# Patient Record
Sex: FEMALE | Race: BLACK OR AFRICAN AMERICAN | NOT HISPANIC OR LATINO | Employment: OTHER | URBAN - METROPOLITAN AREA
[De-identification: names, ages, dates, MRNs, and addresses within clinical notes are randomized per-mention and may not be internally consistent; named-entity substitution may affect disease eponyms.]

---

## 2017-06-14 ENCOUNTER — TELEPHONE (OUTPATIENT)
Dept: OBSTETRICS AND GYNECOLOGY | Facility: CLINIC | Age: 47
End: 2017-06-14

## 2017-06-14 NOTE — TELEPHONE ENCOUNTER
Patient was requesting a sooner appointment.  Left a message for the patient that we could place her on the wait list  If the is a cancellation she will get a call offering her the appointment.

## 2017-06-14 NOTE — TELEPHONE ENCOUNTER
----- Message from Aracely Grande sent at 6/12/2017  2:54 PM CDT -----  Contact: SELF/[phone]  Patient is a new patient that was referred by Dr. Qing Dye for and abnormal pap and she is scheduled for 7/31/17 and she would like to be seen sooner if possible.  Please advise

## 2017-06-19 ENCOUNTER — TELEPHONE (OUTPATIENT)
Dept: OBSTETRICS AND GYNECOLOGY | Facility: CLINIC | Age: 47
End: 2017-06-19

## 2017-06-19 NOTE — TELEPHONE ENCOUNTER
L/M to call me back to set up appt. For 07/12/2017 to see dr. Durham for abnormal pap results.  Hopefully she will call me back.

## 2017-07-12 ENCOUNTER — TELEPHONE (OUTPATIENT)
Dept: OBSTETRICS AND GYNECOLOGY | Facility: CLINIC | Age: 47
End: 2017-07-12

## 2017-07-12 NOTE — TELEPHONE ENCOUNTER
Spoke with patient, patient is upset about rescheduling said she wants to be seen tomm or Friday. Patient said she has been waiting on her appt For a month and it is planned around her cycle,was RUDE because she was upset. Told patient I would have talk to you before scheduling her because Thursdays are for OBS and Friday we are booked.

## 2017-07-13 NOTE — PROGRESS NOTES
I'm sorry that the patient had to be rescheduled.  There was an emergency surgery that had to be completed today.      I would be happy to see this patient this Friday July 14 at 930am to discuss management of abnormal pap smear. She will need to bring a copy of her pathology report as I do NOT have this information in my office.  It is ok if she is on her cycle. She will NOT have a procedure completed at her initial visit. The initial visit is consultation to discuss management of abnormal pap smear.    Dr Durham

## 2017-07-31 ENCOUNTER — OFFICE VISIT (OUTPATIENT)
Dept: OBSTETRICS AND GYNECOLOGY | Facility: CLINIC | Age: 47
End: 2017-07-31
Payer: COMMERCIAL

## 2017-07-31 ENCOUNTER — HOSPITAL ENCOUNTER (OUTPATIENT)
Dept: RADIOLOGY | Facility: HOSPITAL | Age: 47
Discharge: HOME | End: 2017-07-31
Attending: OBSTETRICS & GYNECOLOGY
Payer: COMMERCIAL

## 2017-07-31 VITALS
DIASTOLIC BLOOD PRESSURE: 82 MMHG | BODY MASS INDEX: 26.41 KG/M2 | SYSTOLIC BLOOD PRESSURE: 120 MMHG | WEIGHT: 134.5 LBS | HEIGHT: 60 IN

## 2017-07-31 DIAGNOSIS — Z12.31 VISIT FOR SCREENING MAMMOGRAM: ICD-10-CM

## 2017-07-31 DIAGNOSIS — N87.9 CERVICAL DYSPLASIA: Primary | ICD-10-CM

## 2017-07-31 PROCEDURE — 99499 UNLISTED E&M SERVICE: CPT | Mod: S$GLB,,, | Performed by: OBSTETRICS & GYNECOLOGY

## 2017-07-31 PROCEDURE — 88305 TISSUE EXAM BY PATHOLOGIST: CPT | Performed by: PATHOLOGY

## 2017-07-31 PROCEDURE — 77063 BREAST TOMOSYNTHESIS BI: CPT | Mod: 26,,, | Performed by: RADIOLOGY

## 2017-07-31 PROCEDURE — 57454 BX/CURETT OF CERVIX W/SCOPE: CPT | Mod: S$GLB,,, | Performed by: OBSTETRICS & GYNECOLOGY

## 2017-07-31 PROCEDURE — 99999 PR PBB SHADOW E&M-EST. PATIENT-LVL III: CPT | Mod: PBBFAC,,, | Performed by: OBSTETRICS & GYNECOLOGY

## 2017-07-31 PROCEDURE — 77067 SCR MAMMO BI INCL CAD: CPT | Mod: 26,,, | Performed by: RADIOLOGY

## 2017-07-31 PROCEDURE — 77067 SCR MAMMO BI INCL CAD: CPT | Mod: TC

## 2017-07-31 NOTE — PROGRESS NOTES
Colposcopy  Date: 2017  Time:2:50 PM  Name of the procedure: Colposcopy  Indications: Tena Pabon is a 46 y.o. female  who presents today for colposcopy.  Patient's last menstrual period was 2017..      Patient consent: Risks/benefits of the procedure were discussed with the patient. Patient's questions were answered.  Consents signed.  TIME OUT completed.  Labs:   Office urine pregnancy test negative; Pap LGSIL pap from 3/2017Procedure: Speculum placed in the vagina to visualize the cervix.   No gross lesions appreciated  Acetic acid applied to cervix. Aceto-white lesion noted at 3 oclock; Lugol's solution applied and lesion still noted at 3  oclock.   No mosaicism, no abnormal blood vessels. Biopsy taken. ECC collected.  Transformation zone completely visualized and yes lesions appreciated.  Hemostasis achieved.  Complications:none  EBL: min  Disposition: Pt tolerated the procedure well.      Impression: satisfactory pap smear, CHITRA 1    A/P:   1) Cervical Dysplasia  -patient s/p uncomplicated colposcopy  -Patient instructed to contact MD or report to emergency room for fever greater than 100.4F, vaginal bleeding greater than 2 pads/hour, severe abdominal pain not relieved with NSAIDs.    Order for mammogram placed    NAYANA Durham MD

## 2017-07-31 NOTE — LETTER
July 31, 2017      Qing Dye MD  [address]  [address]  [place] [address]           [facility]  [address], [address]  5th [address] [facility]  [place] [address]  Phone: [phone]          Patient: Tena Pabon   MR Number: [id number]   YOB: 1970   Date of Visit: 7/31/2017       Dear Dr. Qing Dye:    Thank you for referring Tena Pabon to me for evaluation. Attached you will find relevant portions of my assessment and plan of care.    If you have questions, please do not hesitate to call me. I look forward to following Tena Pabon along with you.    Sincerely,    Karissa Durham MD    Enclosure  CC:  No Recipients    If you would like to receive this communication electronically, please contact [email or web address] or [phone] to request more information on [facility] access.    For providers and/or their staff who would like to refer a patient to [facility], please contact us through our one-stop-shop provider referral line, [facility], at [phone].    If you feel you have received this communication in error or would no longer like to receive these types of communications, please e-mail [email or web address]

## 2017-08-11 ENCOUNTER — TELEPHONE (OUTPATIENT)
Dept: OBSTETRICS AND GYNECOLOGY | Facility: CLINIC | Age: 47
End: 2017-08-11

## 2017-08-11 NOTE — TELEPHONE ENCOUNTER
----- Message from Karissa Durham MD sent at 8/6/2017  9:17 PM CDT -----  Inform patient that colposcopy biopsy shows CHITRA 1. Recommendation is to repeat pap in one year.    roxanne durham MD

## 2017-08-11 NOTE — TELEPHONE ENCOUNTER
Left a message for the patient to call the office  Need to inform her that her colposcopy biopsy shows CIN1  Recommendation is to repeat pap in one year

## 2017-08-31 ENCOUNTER — TELEPHONE (OUTPATIENT)
Dept: OBSTETRICS AND GYNECOLOGY | Facility: CLINIC | Age: 47
End: 2017-08-31

## 2017-08-31 NOTE — TELEPHONE ENCOUNTER
----- Message from Lia Hanks sent at 8/30/2017  4:33 PM CDT -----  Contact: [phone]/self  Pt states she's returning your call.  Please advise       L/m for patient to give us a call back

## 2017-09-01 ENCOUNTER — OFFICE VISIT (OUTPATIENT)
Dept: URGENT CARE | Facility: CLINIC | Age: 47
End: 2017-09-01
Payer: COMMERCIAL

## 2017-09-01 VITALS
WEIGHT: 134 LBS | BODY MASS INDEX: 25.3 KG/M2 | HEART RATE: 88 BPM | SYSTOLIC BLOOD PRESSURE: 142 MMHG | OXYGEN SATURATION: 99 % | TEMPERATURE: 99 F | HEIGHT: 61 IN | DIASTOLIC BLOOD PRESSURE: 98 MMHG | RESPIRATION RATE: 16 BRPM

## 2017-09-01 DIAGNOSIS — H61.21 RIGHT EAR IMPACTED CERUMEN: Primary | ICD-10-CM

## 2017-09-01 DIAGNOSIS — H61.22 EXCESSIVE CERUMEN IN LEFT EAR CANAL: ICD-10-CM

## 2017-09-01 PROCEDURE — 99203 OFFICE O/P NEW LOW 30 MIN: CPT | Mod: 25,S$GLB,, | Performed by: FAMILY MEDICINE

## 2017-09-01 PROCEDURE — 69209 REMOVE IMPACTED EAR WAX UNI: CPT | Mod: 50,S$GLB,, | Performed by: FAMILY MEDICINE

## 2017-09-01 PROCEDURE — 3008F BODY MASS INDEX DOCD: CPT | Mod: S$GLB,,, | Performed by: FAMILY MEDICINE

## 2017-09-01 NOTE — PROCEDURES
"Ear Cerumen Removal  Date/Time: 9/1/2017 10:16 AM  Performed by: RORY CHILDRESS  Authorized by: RORY CHILDRESS     Time out: Immediately prior to procedure a "time out" was called to verify the correct patient, procedure, equipment, support staff and site/side marked as required.    Consent Done?:  Yes (Verbal)    Local anesthetic:  None  Ceruminolytics applied: Ceruminolytics applied prior to the procedure    Medication Used:  Debrox  Location details:  Both ears  Procedure type: irrigation    Cerumen  Removal Results:  Cerumen completely removed  Patient tolerance:  Patient tolerated the procedure well with no immediate complications      "

## 2017-09-01 NOTE — PROGRESS NOTES
"Subjective:       Patient ID: Tena Pabon is a 46 y.o. female.    Vitals:  height is 5' 1" (1.549 m) and weight is 60.8 kg (134 lb). Her tympanic temperature is 98.5 °F (36.9 °C). Her blood pressure is 142/98 (abnormal) and her pulse is 88. Her respiration is 16 and oxygen saturation is 99%.     Chief Complaint: Hearing Problem    Ear Fullness    There is pain in both ears. This is a new problem. The current episode started more than 1 month ago. The problem occurs constantly. The problem has been gradually worsening. There has been no fever. The patient is experiencing no pain. Associated symptoms include hearing loss. Pertinent negatives include no abdominal pain, diarrhea, headaches, rash, sore throat or vomiting. She has tried nothing for the symptoms.     Review of Systems   Constitution: Negative for chills and fever.   HENT: Positive for hearing loss. Negative for sore throat.    Eyes: Negative for blurred vision.   Cardiovascular: Negative for chest pain.   Respiratory: Negative for shortness of breath.    Skin: Negative for rash.   Musculoskeletal: Negative for back pain and joint pain.   Gastrointestinal: Negative for abdominal pain, diarrhea, nausea and vomiting.   Neurological: Negative for headaches.   Psychiatric/Behavioral: The patient is not nervous/anxious.        Objective:      Physical Exam   Constitutional: She is oriented to person, place, and time. She appears well-developed and well-nourished. She is cooperative.  Non-toxic appearance. She does not appear ill. No distress.   HENT:   Head: Normocephalic and atraumatic.   Right Ear: Hearing, tympanic membrane, external ear and ear canal normal.   Left Ear: Hearing, tympanic membrane, external ear and ear canal normal.   Nose: Nose normal. No mucosal edema, rhinorrhea or nasal deformity. No epistaxis. Right sinus exhibits no maxillary sinus tenderness and no frontal sinus tenderness. Left sinus exhibits no maxillary sinus tenderness " and no frontal sinus tenderness.   Mouth/Throat: Uvula is midline, oropharynx is clear and moist and mucous membranes are normal. No trismus in the jaw. Normal dentition. No uvula swelling. No posterior oropharyngeal erythema.   Right ear impacted cerumen. Left ear canal ceruminous.   Eyes: Conjunctivae and lids are normal. No scleral icterus.   Sclera clear bilat   Neck: Trachea normal, full passive range of motion without pain and phonation normal. Neck supple.   Cardiovascular: Normal rate, regular rhythm, normal heart sounds, intact distal pulses and normal pulses.    Pulmonary/Chest: Effort normal and breath sounds normal. No respiratory distress.   Abdominal: Soft. Normal appearance and bowel sounds are normal. She exhibits no distension. There is no tenderness.   Musculoskeletal: Normal range of motion. She exhibits no edema or deformity.   Neurological: She is alert and oriented to person, place, and time. She exhibits normal muscle tone. Coordination normal.   Skin: Skin is warm, dry and intact. She is not diaphoretic. No pallor.   Psychiatric: She has a normal mood and affect. Her speech is normal and behavior is normal. Judgment and thought content normal. Cognition and memory are normal.   Nursing note and vitals reviewed.      Assessment:       1. Right ear impacted cerumen    2. Excessive cerumen in left ear canal        Plan:         Follow up with PCP/Specialist if not any better as discussed.   To ER of [facility] for any worsening of symptoms.  Patient/Guardian voiced understanding and agreement.

## 2017-09-07 ENCOUNTER — TELEPHONE (OUTPATIENT)
Dept: OBSTETRICS AND GYNECOLOGY | Facility: CLINIC | Age: 47
End: 2017-09-07

## 2017-09-07 NOTE — TELEPHONE ENCOUNTER
----- Message from Berna Raymond sent at 9/6/2017  4:15 PM CDT -----  Contact: Self [phone]  Patient Returning Your Phone Call    Called patient l/m to give us a call back

## 2018-05-21 ENCOUNTER — OFFICE VISIT (OUTPATIENT)
Dept: URGENT CARE | Facility: CLINIC | Age: 48
End: 2018-05-21
Payer: COMMERCIAL

## 2018-05-21 VITALS
RESPIRATION RATE: 18 BRPM | SYSTOLIC BLOOD PRESSURE: 134 MMHG | HEART RATE: 76 BPM | HEIGHT: 61 IN | DIASTOLIC BLOOD PRESSURE: 92 MMHG | TEMPERATURE: 98 F | OXYGEN SATURATION: 98 %
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
## 2018-05-21 DIAGNOSIS — T19.2XXA RETAINED TAMPON, INITIAL ENCOUNTER: Primary | ICD-10-CM

## 2018-05-21 PROCEDURE — 99214 OFFICE O/P EST MOD 30 MIN: CPT | Mod: S$GLB,,, | Performed by: NURSE PRACTITIONER

## 2018-05-21 NOTE — PATIENT INSTRUCTIONS
Vaginal Foreign Body, Removed (Adult)    Any object placed inside the vagina is called a vaginal foreign body. This includes tampons, birth control devices, and sex toys. In some cases, objects not designed for the vagina may be placed inside.  If an object is left inside the vagina too long or becomes stuck, it can cause symptoms over time. It can also lead to infection and damage nearby tissues.  Symptoms can include unusual or foul-smelling discharge. Bleeding, redness, swelling, or rash may also occur. Some women may feel pain or pressure in or around the vagina.  Treatment involves removing the object. Once the object is removed, symptoms should go away. If the object caused an infection, antibiotics may be given.  Home care  · If youre prescribed any medicines, be sure to take them as directed.  · Dont douche unless advised to by your provider.  · Wait until all symptoms have gone away before having sex.  · Check with your provider before using tampons. If its OK, remember to remove each tampon you use after 6 to 8 hours.  Follow-up care  Follow up with your healthcare provider, or as advised.  When to seek medical advice  Call your healthcare provider right away if any of these occur:  · Your symptoms dont improve or worsen.  · You develop pain in the belly.  · You have burning or pain during urination.  · You have a fever of 100.4ºF (38ºC) or higher, or as directed by your provider.  · You feel weak, dizzy, or faint.  Date Last Reviewed: 7/30/2015  © 4031-0680 [employer]. [address] [place] [address], [place] [address]. All rights reserved. This information is not intended as a substitute for professional medical care. Always follow your healthcare professional's instructions.      -Avoid using the same tampon brand.  -Follow up with your primary care doctor of OBGYN  -If you start to develop burning with urination or lower abdominal pain return here or go to the ER.

## 2018-05-21 NOTE — PROGRESS NOTES
"Subjective:       Patient ID: Tena Pabon is a 47 y.o. female.    Vitals:  height is 5' 1" (1.549 m). Her temperature is 98.3 °F (36.8 °C). Her blood pressure is 134/92 (abnormal) and her pulse is 76. Her respiration is 18 and oxygen saturation is 98%.     Chief Complaint: Female  Problem    The patient presents to the clinic today with complaints of possible retained tampon. She placed a tampon in at 6 am and went to remove the tampon PTA and was unable to find the tampon.       Female  Problem   The patient's pertinent negatives include no missed menses. This is a new problem. The current episode started today. The problem occurs constantly. The problem has been unchanged. The patient is experiencing no pain. She is not pregnant. Pertinent negatives include no abdominal pain, back pain, chills, dysuria, fever, hematuria, nausea, urgency or vomiting. Nothing aggravates the symptoms. She has tried nothing for the symptoms. She uses nothing for contraception. Her menstrual history has been regular.     Review of Systems   Constitution: Negative for chills and fever.   Skin: Negative for itching.   Musculoskeletal: Negative for back pain.   Gastrointestinal: Negative for abdominal pain, nausea and vomiting.   Genitourinary: Negative for dysuria, genital sores, hematuria, missed menses, non-menstrual bleeding and urgency.       Objective:      Physical Exam   Constitutional: She is oriented to person, place, and time. She appears well-developed and well-nourished.   HENT:   Head: Normocephalic and atraumatic.   Right Ear: External ear normal.   Left Ear: External ear normal.   Nose: Nose normal. No nasal deformity. No epistaxis.   Mouth/Throat: Oropharynx is clear and moist and mucous membranes are normal.   Eyes: Conjunctivae and lids are normal.   Neck: Trachea normal, normal range of motion and phonation normal. Neck supple.   Cardiovascular: Normal rate, regular rhythm, normal heart sounds and normal " pulses.    Pulmonary/Chest: Effort normal and breath sounds normal.   Abdominal: Soft. Normal appearance and bowel sounds are normal. She exhibits no distension and no mass. There is no tenderness. There is no CVA tenderness.   Genitourinary: There is no rash or tenderness on the right labia. There is no rash or tenderness on the left labia. Right adnexum displays no mass, no tenderness and no fullness. Left adnexum displays no mass, no tenderness and no fullness. There is bleeding in the vagina. No signs of injury around the vagina.   Genitourinary Comments: Blood present in the vaginal canal. Patient currently menstruating. Retained tampon removed with forceps. Patient tolerated procedure well.    Neurological: She is alert and oriented to person, place, and time.   Skin: Skin is warm, dry and intact.   Psychiatric: She has a normal mood and affect. Her speech is normal and behavior is normal. Cognition and memory are normal.   Nursing note and vitals reviewed.    Procedures  Assessment:       1. Retained tampon, initial encounter        Plan:         Retained tampon, initial encounter      Patient Instructions     Vaginal Foreign Body, Removed (Adult)    Any object placed inside the vagina is called a vaginal foreign body. This includes tampons, birth control devices, and sex toys. In some cases, objects not designed for the vagina may be placed inside.  If an object is left inside the vagina too long or becomes stuck, it can cause symptoms over time. It can also lead to infection and damage nearby tissues.  Symptoms can include unusual or foul-smelling discharge. Bleeding, redness, swelling, or rash may also occur. Some women may feel pain or pressure in or around the vagina.  Treatment involves removing the object. Once the object is removed, symptoms should go away. If the object caused an infection, antibiotics may be given.  Home care  · If youre prescribed any medicines, be sure to take them as  directed.  · Dont douche unless advised to by your provider.  · Wait until all symptoms have gone away before having sex.  · Check with your provider before using tampons. If its OK, remember to remove each tampon you use after 6 to 8 hours.  Follow-up care  Follow up with your healthcare provider, or as advised.  When to seek medical advice  Call your healthcare provider right away if any of these occur:  · Your symptoms dont improve or worsen.  · You develop pain in the belly.  · You have burning or pain during urination.  · You have a fever of 100.4ºF (38ºC) or higher, or as directed by your provider.  · You feel weak, dizzy, or faint.  Date Last Reviewed: 7/30/2015  © 4171-7604 [employer]. [address] [place] [address], [place] [address]. All rights reserved. This information is not intended as a substitute for professional medical care. Always follow your healthcare professional's instructions.      -Avoid using the same tampon brand.  -Follow up with your primary care doctor of OBGYN  -If you start to develop burning with urination or lower abdominal pain return here or go to the ER.

## 2018-05-24 ENCOUNTER — TELEPHONE (OUTPATIENT)
Dept: URGENT CARE | Facility: CLINIC | Age: 48
End: 2018-05-24

## 2018-07-21 ENCOUNTER — OFFICE VISIT (OUTPATIENT)
Dept: URGENT CARE | Facility: CLINIC | Age: 48
End: 2018-07-21
Payer: COMMERCIAL

## 2018-07-21 VITALS
HEIGHT: 61 IN | HEART RATE: 67 BPM | SYSTOLIC BLOOD PRESSURE: 124 MMHG | WEIGHT: 134 LBS | TEMPERATURE: 98 F | RESPIRATION RATE: 18 BRPM | BODY MASS INDEX: 25.3 KG/M2 | OXYGEN SATURATION: 99 % | DIASTOLIC BLOOD PRESSURE: 92 MMHG

## 2018-07-21 DIAGNOSIS — B37.31 YEAST VAGINITIS: Primary | ICD-10-CM

## 2018-07-21 PROCEDURE — 3008F BODY MASS INDEX DOCD: CPT | Mod: CPTII,S$GLB,, | Performed by: PHYSICIAN ASSISTANT

## 2018-07-21 PROCEDURE — 99214 OFFICE O/P EST MOD 30 MIN: CPT | Mod: S$GLB,,, | Performed by: PHYSICIAN ASSISTANT

## 2018-07-21 RX ORDER — FLUCONAZOLE 150 MG/1
150 TABLET ORAL DAILY
Qty: 2 TABLET | Refills: 0 | Status: SHIPPED | OUTPATIENT
Start: 2018-07-21 | End: 2018-07-22

## 2018-07-21 NOTE — PROGRESS NOTES
"Subjective:       Patient ID: Tena Pabon is a 47 y.o. female.    Vitals:  height is 5' 1" (1.549 m) and weight is 60.8 kg (134 lb). Her temperature is 98.2 °F (36.8 °C). Her blood pressure is 124/92 (abnormal) and her pulse is 67. Her respiration is 18 and oxygen saturation is 99%.     Chief Complaint: Vaginal Discharge    Denies concern for STD. Started monistat OTC with moderate improvement. C/O white discharge with itching.       Vaginal Discharge   The patient's primary symptoms include vaginal discharge. The patient's pertinent negatives include no genital itching, genital lesions, genital odor, genital rash, missed menses, pelvic pain or vaginal bleeding. This is a new problem. The current episode started in the past 7 days (6 days ago). The problem occurs constantly. The problem has been gradually improving. The patient is experiencing no pain. She is not pregnant. Pertinent negatives include no abdominal pain, back pain, chills, dysuria, fever, hematuria, nausea, urgency or vomiting. The vaginal discharge was clear. There has been no bleeding. She has not been passing clots. She has not been passing tissue. She is sexually active. No, her partner does not have an STD. Her menstrual history has been regular. There is no history of an abdominal surgery, a  section, an ectopic pregnancy, endometriosis, a gynecological surgery, herpes simplex, menorrhagia, metrorrhagia, miscarriage, ovarian cysts, perineal abscess, PID, an STD, a terminated pregnancy or vaginosis.     Review of Systems   Constitution: Negative for chills and fever.   Skin: Negative for itching.   Musculoskeletal: Negative for back pain.   Gastrointestinal: Negative for abdominal pain, nausea and vomiting.   Genitourinary: Positive for vaginal discharge. Negative for dysuria, genital sores, hematuria, menorrhagia, missed menses, non-menstrual bleeding, pelvic pain and urgency.       Objective:      Physical Exam "   Constitutional: She is oriented to person, place, and time. She appears well-developed and well-nourished. She is cooperative.  Non-toxic appearance. She does not appear ill. No distress.   HENT:   Head: Normocephalic and atraumatic.   Right Ear: Hearing, tympanic membrane, external ear and ear canal normal.   Left Ear: Hearing, tympanic membrane, external ear and ear canal normal.   Nose: Nose normal. No mucosal edema, rhinorrhea or nasal deformity. No epistaxis. Right sinus exhibits no maxillary sinus tenderness and no frontal sinus tenderness. Left sinus exhibits no maxillary sinus tenderness and no frontal sinus tenderness.   Mouth/Throat: Uvula is midline, oropharynx is clear and moist and mucous membranes are normal. No trismus in the jaw. Normal dentition. No uvula swelling. No posterior oropharyngeal erythema.   Eyes: Conjunctivae and lids are normal. Right eye exhibits no discharge. Left eye exhibits no discharge. No scleral icterus.   Sclera clear bilat   Neck: Trachea normal, normal range of motion, full passive range of motion without pain and phonation normal. Neck supple.   Cardiovascular: Normal rate, regular rhythm, normal heart sounds, intact distal pulses and normal pulses.    Pulmonary/Chest: Effort normal and breath sounds normal. No respiratory distress.   Abdominal: Soft. Normal appearance and bowel sounds are normal. She exhibits no distension, no pulsatile midline mass and no mass. There is no tenderness. There is no rigidity, no rebound, no guarding, no CVA tenderness, no tenderness at McBurney's point and negative Martinez's sign.   Musculoskeletal: Normal range of motion. She exhibits no edema or deformity.   Neurological: She is alert and oriented to person, place, and time. She exhibits normal muscle tone. Coordination normal.   Skin: Skin is warm, dry and intact. She is not diaphoretic. No pallor.   Psychiatric: She has a normal mood and affect. Her speech is normal and behavior is  normal. Judgment and thought content normal. Cognition and memory are normal.   Nursing note and vitals reviewed.      Assessment:       1. Yeast vaginitis        Plan:         Yeast vaginitis    Other orders  -     fluconazole (DIFLUCAN) 150 MG Tab; Take 1 tablet (150 mg total) by mouth once daily. May repeat dose in 72 hours if symptoms persist for 1 day  Dispense: 2 tablet; Refill: 0        Vaginal Infection: Yeast (Candidiasis)  Yeast infection occurs when yeast in the vagina increase and attacks the vaginal tissues. Yeast is a type of fungus. These infections are often caused by a type of yeast called Candida albicans. Other species of yeast can also cause infections. Factors that may make infection more likely include recent antibiotic use, douching, or increased sex. Yeast infections are more common in women who have diabetes, or are obese or pregnant, or have a weak immune system.  Symptoms of yeast infection  · Clumpy or thin, white discharge, which may look like cottage cheese  · No odor or minimal odor  · Severe vaginal itching or burning  · Burning with urination  · Swelling, redness of vulva  · Pain during sex  Treating yeast infection  Yeast infection is treated with a vaginal antifungal cream. In some cases, antifungal pills are prescribed instead. During treatment:  · Finish all of your medicine, even if your symptoms go away.  · Apply the cream before going to bed. Lie flat after applying so that it doesn't drip out.  · Do not douche or use tampons.  · Don't rely on a diaphragm or condoms, since the cream may weaken them.  · Avoid intercourse if advised by your healthcare provider.     Should I treat a yeast infection myself?  Discuss with your healthcare provider whether you should use over-the-counter medicines to treat a yeast infection. Self-treatment may depend on whether:  · You've had a yeast infection in the past.  · You're at risk for STDs.  Call your healthcare provider if symptoms do not  go away or come back after treatment.   Date Last Reviewed: 3/1/2017  © 5150-4002 [employer]. [address] [place] [address], [place] [address]. All rights reserved. This information is not intended as a substitute for professional medical care. Always follow your healthcare professional's instructions.      Please follow up with your Primary care provider within 2-5 days if your signs and symptoms have not resolved or worsen.     If your condition worsens or fails to improve we recommend that you receive another evaluation at the emergency room immediately or contact your primary medical clinic to discuss your concerns.   You must understand that you have received an Urgent Care treatment only and that you may be released before all of your medical problems are known or treated. You, the patient, will arrange for follow up care as instructed.     RED FLAGS/WARNING SYMPTOMS DISCUSSED WITH PATIENT THAT WOULD WARRANT EMERGENT MEDICAL ATTENTION. PATIENT VERBALIZED UNDERSTANDING.

## 2018-07-24 ENCOUNTER — TELEPHONE (OUTPATIENT)
Dept: URGENT CARE | Facility: CLINIC | Age: 48
End: 2018-07-24

## 2018-07-24 NOTE — TELEPHONE ENCOUNTER
Called patient, no answer, could not leave message. I was calling as a follow up in regards to patient's visit on 7/21/2018.

## 2018-09-12 ENCOUNTER — TELEPHONE (OUTPATIENT)
Dept: OBSTETRICS AND GYNECOLOGY | Facility: CLINIC | Age: 48
End: 2018-09-12

## 2018-09-12 DIAGNOSIS — Z12.39 SCREENING BREAST EXAMINATION: Primary | ICD-10-CM

## 2018-09-12 NOTE — TELEPHONE ENCOUNTER
----- Message from Lisa Guzman sent at 9/12/2018 10:12 AM CDT -----  Contact: [phone]/self  Patient requesting orders for a mammogram. Please advise.

## 2018-09-12 NOTE — TELEPHONE ENCOUNTER
----- Message from Eugenia Rehman sent at 9/12/2018 10:05 AM CDT -----  Contact: self, [phone]  Patient called in returning your call. Please advise.

## 2018-09-12 NOTE — TELEPHONE ENCOUNTER
I spoke with the patient and she would like the orders for the mammogram put in now so that she can get the appointment on the same day as her annual exam here in October.  Please advise.

## 2018-09-12 NOTE — TELEPHONE ENCOUNTER
----- Message from Tram Degroot sent at 9/12/2018  9:35 AM CDT -----  Contact: [phone]/ self   Pt requesting orders for a mammo gram. Please advise

## 2018-10-01 ENCOUNTER — TELEPHONE (OUTPATIENT)
Dept: OBSTETRICS AND GYNECOLOGY | Facility: CLINIC | Age: 48
End: 2018-10-01

## 2018-10-01 ENCOUNTER — HOSPITAL ENCOUNTER (OUTPATIENT)
Dept: RADIOLOGY | Facility: HOSPITAL | Age: 48
Discharge: HOME | End: 2018-10-01
Attending: OBSTETRICS & GYNECOLOGY
Payer: COMMERCIAL

## 2018-10-01 DIAGNOSIS — Z12.39 SCREENING BREAST EXAMINATION: ICD-10-CM

## 2018-10-01 PROCEDURE — 77063 BREAST TOMOSYNTHESIS BI: CPT | Mod: 26,,, | Performed by: RADIOLOGY

## 2018-10-01 PROCEDURE — 77063 BREAST TOMOSYNTHESIS BI: CPT | Mod: TC

## 2018-10-01 PROCEDURE — 77067 SCR MAMMO BI INCL CAD: CPT | Mod: 26,,, | Performed by: RADIOLOGY

## 2018-10-01 PROCEDURE — 77067 SCR MAMMO BI INCL CAD: CPT | Mod: TC

## 2018-10-01 NOTE — TELEPHONE ENCOUNTER
The patient came in at 11:45 for her 11:15 am appt.  Per Dr. Durham, we needed to reschedule.  I spoke with the patient and she was very upset and stated that we got the time wrong, that she was scheduled at 11:30.  I told her that she was still late and we had to reschedule.  She was very upset still and I started looking for a space to put her in.  She then said, I'll just call back and stormed out.  About 5 min later she came back demanding to be scheduled with someone right now/today because she had taken the day off.   I explained that everyone was at lunch and I would be happy to contact each doctor that was in this afternoon and get back to her.  I explained that we don't have many doctors in so I thought it was unlikely that she could get in.  She again was upset.  I apologized several times       I asked all of the doctors that were in this afternoon and there are no openings for her to be seen.  I tried to call the patient and she has no voicemail.  I will try again.

## 2018-10-19 ENCOUNTER — OFFICE VISIT (OUTPATIENT)
Dept: OBSTETRICS AND GYNECOLOGY | Facility: CLINIC | Age: 48
End: 2018-10-19
Payer: COMMERCIAL

## 2018-10-19 VITALS
DIASTOLIC BLOOD PRESSURE: 68 MMHG | SYSTOLIC BLOOD PRESSURE: 120 MMHG | WEIGHT: 130.06 LBS | BODY MASS INDEX: 24.58 KG/M2

## 2018-10-19 DIAGNOSIS — Z87.42 HISTORY OF ABNORMAL CERVICAL PAP SMEAR: ICD-10-CM

## 2018-10-19 DIAGNOSIS — Z01.419 ENCOUNTER FOR ANNUAL ROUTINE GYNECOLOGICAL EXAMINATION: Primary | ICD-10-CM

## 2018-10-19 DIAGNOSIS — Z11.3 SCREENING EXAMINATION FOR STD (SEXUALLY TRANSMITTED DISEASE): ICD-10-CM

## 2018-10-19 DIAGNOSIS — Z13.220 LIPID SCREENING: ICD-10-CM

## 2018-10-19 DIAGNOSIS — Z12.31 SCREENING MAMMOGRAM, ENCOUNTER FOR: ICD-10-CM

## 2018-10-19 DIAGNOSIS — Z12.4 PAP SMEAR FOR CERVICAL CANCER SCREENING: ICD-10-CM

## 2018-10-19 DIAGNOSIS — Z13.29 THYROID DISORDER SCREEN: ICD-10-CM

## 2018-10-19 PROCEDURE — 87660 TRICHOMONAS VAGIN DIR PROBE: CPT

## 2018-10-19 PROCEDURE — 88175 CYTOPATH C/V AUTO FLUID REDO: CPT

## 2018-10-19 PROCEDURE — 99396 PREV VISIT EST AGE 40-64: CPT | Mod: S$GLB,,, | Performed by: OBSTETRICS & GYNECOLOGY

## 2018-10-19 PROCEDURE — 87624 HPV HI-RISK TYP POOLED RSLT: CPT

## 2018-10-19 PROCEDURE — 87491 CHLMYD TRACH DNA AMP PROBE: CPT

## 2018-10-19 PROCEDURE — 99999 PR PBB SHADOW E&M-EST. PATIENT-LVL II: CPT | Mod: PBBFAC,,, | Performed by: OBSTETRICS & GYNECOLOGY

## 2018-10-19 NOTE — PROGRESS NOTES
Chief Complaint   Patient presents with    Well Woman    Abnormal Pap Smear       HISTORY OF PRESENT ILLNESS:   Tena Pabon is a 48 y.o. female  who presents for well woman exam.  No LMP recorded..  She has no complaints. Cycles areregular. Desires STD testing.      Past Medical History:   Diagnosis Date    Abnormal Pap smear of cervix           Past Surgical History:   Procedure Laterality Date    WISDOM TOOTH EXTRACTION           Social History     Socioeconomic History    Marital status: Single     Spouse name: Not on file    Number of children: Not on file    Years of education: Not on file    Highest education level: Not on file   Social Needs    Financial resource strain: Not on file    Food insecurity - worry: Not on file    Food insecurity - inability: Not on file    Transportation needs - medical: Not on file    Transportation needs - non-medical: Not on file   Occupational History    Not on file   Tobacco Use    Smoking status: Never Smoker    Smokeless tobacco: Never Used   Substance and Sexual Activity    Alcohol use: Yes    Drug use: No    Sexual activity: Not Currently     Partners: Male   Other Topics Concern    Not on file   Social History Narrative    Not on file       No family history on file.      OB History    Para Term  AB Living   0 0 0 0 0 0   SAB TAB Ectopic Multiple Live Births   0 0 0 0 0             COMPREHENSIVE GYN HISTORY:  PAP History: Denies abnormal Paps, LSIL 2017 with colpo with CHITRA 1 (was only abn pap smear)  Infection History: Denies STDs. Denies PID.  Benign History: Denies uterine fibroids. Denies ovarian cysts. Denies endometriosis Denies other conditions.  Cancer History: Denies cervical cancer. Denies uterine cancer or hyperplasia. Denies ovarian cancer. Denies vulvar cancer or pre-cancer. Denies vaginal cancer or pre-cancer. Denies breast cancer. Denies colon cancer.  Cycle: 13/31/3-4d    ROS:  GENERAL: Denies weight gain or  weight loss. Feeling well overall.   SKIN: Denies rash or lesions.   HEAD: Denies headache.   NODES: Denies enlarged lymph nodes.   CHEST: Denies shortness of breath.   ABDOMEN: No abdominal pain, constipation, diarrhea, nausea, vomiting or rectal bleeding.   URINARY: No frequency, dysuria, hematuria, or burning on urination.  REPRODUCTIVE: See HPI.   BREASTS: The patient denies pain, lumps, or nipple discharge.       There were no vitals taken for this visit.  APPEARANCE: Well nourished, well developed, in no acute distress.  NECK: Neck symmetric without  thyromegaly.  NODES: No inguinal, cervical lymph node enlargement.  CHEST: Lungs clear to auscultation.  HEART: Regular rate and rhythm, no murmurs, rubs or gallops.  ABDOMEN: Soft. No tenderness or masses. No hernias. No hepatosplenomegaly.  BREASTS: Symmetrical, no skin changes or visible lesions. No palpable masses, nipple discharge or adenopathy bilaterally.  PELVIC:   VULVA: No lesions. Normal female genitalia.  URETHRAL MEATUS: Normal size and location, no lesions, no prolapse.  URETHRA: No masses, tenderness, prolapse or scarring.  VAGINA: Moist and well rugated, no discharge, no significant cystocele or rectocele.  CERVIX: No lesions and discharge.  UTERUS: Normal size, regular shape, mobile, non-tender, bladder base nontender.  ADNEXA: No masses or tenderness.    Data Reviewed:     Last MMG: Date: BIRADs 1      1. Encounter for annual routine gynecological examination    2. Pap smear for cervical cancer screening    3. History of abnormal cervical Pap smear    4. Screening mammogram, encounter for        Plan:   1. Routine gyn annual exam. s/p normal breast exam and MMG ordered.  Pap with HPV cotesting ordered. STD testing: ordered.  Lipid Profile, needed every 5 years,up to date. Fasting glucose, needed every 3 years, up to date.  2. Will need yearly pap smears for at least 2 normals  3. Inquired about her chances of getting pregnant. We discussed that  goes down as her age increases and the chance of having a baby with a chromsome problem like down syndrome goes up. I encouraged her if she interested to see a fertility doctor and she reports she isn't really that interested.         F/u in 1 yr or PRN

## 2018-10-20 LAB
C TRACH DNA SPEC QL NAA+PROBE: NOT DETECTED
CANDIDA RRNA VAG QL PROBE: POSITIVE
G VAGINALIS RRNA GENITAL QL PROBE: NEGATIVE
N GONORRHOEA DNA SPEC QL NAA+PROBE: NOT DETECTED
T VAGINALIS RRNA GENITAL QL PROBE: NEGATIVE

## 2018-10-22 ENCOUNTER — TELEPHONE (OUTPATIENT)
Dept: OBSTETRICS AND GYNECOLOGY | Facility: CLINIC | Age: 48
End: 2018-10-22

## 2018-10-22 RX ORDER — FLUCONAZOLE 150 MG/1
150 TABLET ORAL ONCE
Qty: 1 TABLET | Refills: 0 | Status: SHIPPED | OUTPATIENT
Start: 2018-10-22 | End: 2018-10-22

## 2018-10-22 NOTE — TELEPHONE ENCOUNTER
Please let her know that she tested positive for a yeast infection so I sent a diflucan to the pharmacy for  if she is having itching with a discharge. Her gonorrhea and chlamydia testing was normal.

## 2018-10-23 ENCOUNTER — LAB VISIT (OUTPATIENT)
Dept: LAB | Facility: HOSPITAL | Age: 48
End: 2018-10-23
Attending: OBSTETRICS & GYNECOLOGY
Payer: COMMERCIAL

## 2018-10-23 DIAGNOSIS — Z01.419 ENCOUNTER FOR ANNUAL ROUTINE GYNECOLOGICAL EXAMINATION: ICD-10-CM

## 2018-10-23 DIAGNOSIS — Z11.3 SCREENING EXAMINATION FOR STD (SEXUALLY TRANSMITTED DISEASE): ICD-10-CM

## 2018-10-23 DIAGNOSIS — Z13.220 LIPID SCREENING: ICD-10-CM

## 2018-10-23 DIAGNOSIS — Z13.29 THYROID DISORDER SCREEN: ICD-10-CM

## 2018-10-23 LAB
ALBUMIN SERPL BCP-MCNC: 3.8 G/DL
ALP SERPL-CCNC: 45 U/L
ALT SERPL W/O P-5'-P-CCNC: 11 U/L
ANION GAP SERPL CALC-SCNC: 9 MMOL/L
AST SERPL-CCNC: 21 U/L
BASOPHILS # BLD AUTO: 0.01 K/UL
BASOPHILS NFR BLD: 0.2 %
BILIRUB SERPL-MCNC: 0.8 MG/DL
BUN SERPL-MCNC: 13 MG/DL
CALCIUM SERPL-MCNC: 9.4 MG/DL
CHLORIDE SERPL-SCNC: 105 MMOL/L
CHOLEST SERPL-MCNC: 200 MG/DL
CHOLEST/HDLC SERPL: 2.5 {RATIO}
CO2 SERPL-SCNC: 25 MMOL/L
CREAT SERPL-MCNC: 0.8 MG/DL
DIFFERENTIAL METHOD: ABNORMAL
EOSINOPHIL # BLD AUTO: 0.2 K/UL
EOSINOPHIL NFR BLD: 2.6 %
ERYTHROCYTE [DISTWIDTH] IN BLOOD BY AUTOMATED COUNT: 16 %
EST. GFR  (AFRICAN AMERICAN): >60 ML/MIN/1.73 M^2
EST. GFR  (NON AFRICAN AMERICAN): >60 ML/MIN/1.73 M^2
GLUCOSE SERPL-MCNC: 81 MG/DL
HAV IGM SERPL QL IA: NEGATIVE
HBV CORE IGM SERPL QL IA: NEGATIVE
HBV SURFACE AG SERPL QL IA: NEGATIVE
HBV SURFACE AG SERPL QL IA: NEGATIVE
HCT VFR BLD AUTO: 34.4 %
HCV AB SERPL QL IA: NEGATIVE
HDLC SERPL-MCNC: 79 MG/DL
HDLC SERPL: 39.5 %
HGB BLD-MCNC: 11.4 G/DL
HIV 1+2 AB+HIV1 P24 AG SERPL QL IA: NEGATIVE
LDLC SERPL CALC-MCNC: 112.2 MG/DL
LYMPHOCYTES # BLD AUTO: 1.7 K/UL
LYMPHOCYTES NFR BLD: 29.5 %
MCH RBC QN AUTO: 24.8 PG
MCHC RBC AUTO-ENTMCNC: 33.1 G/DL
MCV RBC AUTO: 75 FL
MONOCYTES # BLD AUTO: 0.4 K/UL
MONOCYTES NFR BLD: 7.2 %
NEUTROPHILS # BLD AUTO: 3.5 K/UL
NEUTROPHILS NFR BLD: 60.5 %
NONHDLC SERPL-MCNC: 121 MG/DL
PLATELET # BLD AUTO: 182 K/UL
PLATELET BLD QL SMEAR: ABNORMAL
PMV BLD AUTO: ABNORMAL FL
POTASSIUM SERPL-SCNC: 3.7 MMOL/L
PROT SERPL-MCNC: 7.9 G/DL
RBC # BLD AUTO: 4.59 M/UL
SODIUM SERPL-SCNC: 139 MMOL/L
TRIGL SERPL-MCNC: 44 MG/DL
TSH SERPL DL<=0.005 MIU/L-ACNC: 1.91 UIU/ML
WBC # BLD AUTO: 5.7 K/UL

## 2018-10-23 PROCEDURE — 84443 ASSAY THYROID STIM HORMONE: CPT

## 2018-10-23 PROCEDURE — 80061 LIPID PANEL: CPT

## 2018-10-23 PROCEDURE — 36415 COLL VENOUS BLD VENIPUNCTURE: CPT

## 2018-10-23 PROCEDURE — 86592 SYPHILIS TEST NON-TREP QUAL: CPT

## 2018-10-23 PROCEDURE — 85025 COMPLETE CBC W/AUTO DIFF WBC: CPT

## 2018-10-23 PROCEDURE — 80074 ACUTE HEPATITIS PANEL: CPT

## 2018-10-23 PROCEDURE — 80053 COMPREHEN METABOLIC PANEL: CPT

## 2018-10-23 PROCEDURE — 86703 HIV-1/HIV-2 1 RESULT ANTBDY: CPT

## 2018-10-24 LAB — RPR SER QL: NORMAL

## 2018-10-25 ENCOUNTER — TELEPHONE (OUTPATIENT)
Dept: OBSTETRICS AND GYNECOLOGY | Facility: HOSPITAL | Age: 48
End: 2018-10-25

## 2018-10-25 LAB
HPV HR 12 DNA CVX QL NAA+PROBE: NEGATIVE
HPV16 AG SPEC QL: NEGATIVE
HPV18 DNA SPEC QL NAA+PROBE: NEGATIVE

## 2018-10-25 NOTE — TELEPHONE ENCOUNTER
Please let patient know that her STD screening including her HIV, syphilis, Hepatitis, GC/CT/trich was negative.    Here thyroid testing looks normal. Her cholesterol is mildly elevated (a normal level is <200 and hers was exactly 200) I would recommend diet and exercise and having them rechecked in a year to make sure she doesn't need to start cholesterol medications. Her electrolytes look normal. She is mildly anemic. I would start an over the counter iron supplement with 65mg of iron in it.     We are still waiting on the results of her pap smear. Thanks.

## 2018-10-25 NOTE — PROGRESS NOTES
Please send patient pap smear letter or call. Her pap was negative and negative for the HPV virus. Since she had an abnormal pap smear last year I would recommend doing another one next year before spacing them out further. Thanks

## 2018-10-29 NOTE — TELEPHONE ENCOUNTER
Second attempt made to contact patient.   Patients number isn't reachable.  Attempted patients mother and voicemail is full.    Do you want a letter sent out to patient to contact the office to discuss results?

## 2018-11-27 ENCOUNTER — TELEPHONE (OUTPATIENT)
Dept: OBSTETRICS AND GYNECOLOGY | Facility: CLINIC | Age: 48
End: 2018-11-27

## 2018-11-27 NOTE — TELEPHONE ENCOUNTER
----- Message from Dorys Wade sent at 11/27/2018  8:38 AM CST -----  Contact: self/[phone]  Patient is returning your call.  She also advised that she received a certified letter.    Please call and advise.

## 2018-11-27 NOTE — TELEPHONE ENCOUNTER
Returned patients call.   Patient was notified of negative STD screenings, normal pap smear, positive yeast culture, and patient lab results and physician advisement in previous message. Patient verbalized understanding. Patient also requested her results be sent to her in a letter for her files and confirmed her address on file. Verbalized understanding

## 2018-11-27 NOTE — TELEPHONE ENCOUNTER
----- Message from Karly Reyez sent at 11/26/2018  3:24 PM CST -----  Contact: [phone]  Patient called in requesting to speak with you. Patient prefers to speak with a nurse. Please call.

## 2018-11-27 NOTE — LETTER
November 27, 2018    Tena Pabon  [address] [address]  [facility] [address]             [facility]  [address], [address]  [address] [facility]  [place] [address]  Phone: [phone] Dear Tena Pabon        Please find enclosed copies of your results. If you have any questions or concerns, please don't hesitate to call.    Sincerely,      Dr. Alana Staples

## 2018-11-27 NOTE — TELEPHONE ENCOUNTER
----- Message from Renetta Pope sent at 11/27/2018  8:59 AM CST -----  Contact: self / Contact: self /[phone]  Patient is returning your call.  She also advised that she received a certified letter. Please advise

## 2019-09-20 NOTE — PATIENT INSTRUCTIONS
Vaginal Infection: Yeast (Candidiasis)  Yeast infection occurs when yeast in the vagina increase and attacks the vaginal tissues. Yeast is a type of fungus. These infections are often caused by a type of yeast called Candida albicans. Other species of yeast can also cause infections. Factors that may make infection more likely include recent antibiotic use, douching, or increased sex. Yeast infections are more common in women who have diabetes, or are obese or pregnant, or have a weak immune system.  Symptoms of yeast infection  · Clumpy or thin, white discharge, which may look like cottage cheese  · No odor or minimal odor  · Severe vaginal itching or burning  · Burning with urination  · Swelling, redness of vulva  · Pain during sex  Treating yeast infection  Yeast infection is treated with a vaginal antifungal cream. In some cases, antifungal pills are prescribed instead. During treatment:  · Finish all of your medicine, even if your symptoms go away.  · Apply the cream before going to bed. Lie flat after applying so that it doesn't drip out.  · Do not douche or use tampons.  · Don't rely on a diaphragm or condoms, since the cream may weaken them.  · Avoid intercourse if advised by your healthcare provider.     Should I treat a yeast infection myself?  Discuss with your healthcare provider whether you should use over-the-counter medicines to treat a yeast infection. Self-treatment may depend on whether:  · You've had a yeast infection in the past.  · You're at risk for STDs.  Call your healthcare provider if symptoms do not go away or come back after treatment.   Date Last Reviewed: 3/1/2017  © 4188-2439 [employer] [address] [place] [address], [place] [address]. All rights reserved. This information is not intended as a substitute for professional medical care. Always follow your healthcare professional's instructions.      Please follow up with your Primary care provider within 2-5 days if your signs  and symptoms have not resolved or worsen.     If your condition worsens or fails to improve we recommend that you receive another evaluation at the emergency room immediately or contact your primary medical clinic to discuss your concerns.   You must understand that you have received an Urgent Care treatment only and that you may be released before all of your medical problems are known or treated. You, the patient, will arrange for follow up care as instructed.     RED FLAGS/WARNING SYMPTOMS DISCUSSED WITH PATIENT THAT WOULD WARRANT EMERGENT MEDICAL ATTENTION. PATIENT VERBALIZED UNDERSTANDING.      Detail Level: Detailed

## 2019-09-23 ENCOUNTER — TELEPHONE (OUTPATIENT)
Dept: OBSTETRICS AND GYNECOLOGY | Facility: CLINIC | Age: 49
End: 2019-09-23

## 2019-09-23 NOTE — TELEPHONE ENCOUNTER
----- Message from Jordin Vuong sent at 9/23/2019 10:32 AM CDT -----  Contact: [phone]  She needs to schedule lab work.

## 2019-09-23 NOTE — TELEPHONE ENCOUNTER
Called patient.  Was unable to get in touch with patient and unable to leave voicemail because mailbox isn't set up.

## 2019-09-24 ENCOUNTER — TELEPHONE (OUTPATIENT)
Dept: OBSTETRICS AND GYNECOLOGY | Facility: CLINIC | Age: 49
End: 2019-09-24

## 2019-09-24 DIAGNOSIS — Z12.39 SCREENING BREAST EXAMINATION: Primary | ICD-10-CM

## 2019-09-24 NOTE — TELEPHONE ENCOUNTER
----- Message from Doris Eid sent at 9/24/2019  9:34 AM CDT -----  Patient called yesterday.     No. [phone]    Please call today.    
Message was sent to Dr. Staples regarding request for orders for mammogram and lab work.  Unable to get in touch with patient yesterday and attempt was made today to call patient.  Unable to reach patient or leave a message.    
yes

## 2019-09-25 ENCOUNTER — TELEPHONE (OUTPATIENT)
Dept: OBSTETRICS AND GYNECOLOGY | Facility: CLINIC | Age: 49
End: 2019-09-25

## 2019-09-26 ENCOUNTER — TELEPHONE (OUTPATIENT)
Dept: OBSTETRICS AND GYNECOLOGY | Facility: CLINIC | Age: 49
End: 2019-09-26

## 2019-10-23 ENCOUNTER — HOSPITAL ENCOUNTER (OUTPATIENT)
Dept: RADIOLOGY | Facility: HOSPITAL | Age: 49
Discharge: HOME | End: 2019-10-23
Attending: OBSTETRICS & GYNECOLOGY
Payer: COMMERCIAL

## 2019-10-23 ENCOUNTER — OFFICE VISIT (OUTPATIENT)
Dept: OBSTETRICS AND GYNECOLOGY | Facility: CLINIC | Age: 49
End: 2019-10-23
Payer: COMMERCIAL

## 2019-10-23 VITALS
SYSTOLIC BLOOD PRESSURE: 104 MMHG | BODY MASS INDEX: 23.3 KG/M2 | HEIGHT: 61 IN | DIASTOLIC BLOOD PRESSURE: 70 MMHG | WEIGHT: 123.44 LBS

## 2019-10-23 DIAGNOSIS — Z11.3 SCREENING EXAMINATION FOR STD (SEXUALLY TRANSMITTED DISEASE): ICD-10-CM

## 2019-10-23 DIAGNOSIS — Z12.4 PAP SMEAR FOR CERVICAL CANCER SCREENING: ICD-10-CM

## 2019-10-23 DIAGNOSIS — Z01.419 ENCOUNTER FOR ANNUAL ROUTINE GYNECOLOGICAL EXAMINATION: Primary | ICD-10-CM

## 2019-10-23 DIAGNOSIS — Z13.1 DIABETES MELLITUS SCREENING: ICD-10-CM

## 2019-10-23 DIAGNOSIS — Z12.39 SCREENING BREAST EXAMINATION: ICD-10-CM

## 2019-10-23 DIAGNOSIS — Z13.220 SCREENING CHOLESTEROL LEVEL: ICD-10-CM

## 2019-10-23 DIAGNOSIS — Z87.42 HISTORY OF ABNORMAL CERVICAL PAP SMEAR: ICD-10-CM

## 2019-10-23 DIAGNOSIS — Z13.29 THYROID DISORDER SCREEN: ICD-10-CM

## 2019-10-23 PROCEDURE — 99999 PR PBB SHADOW E&M-EST. PATIENT-LVL II: CPT | Mod: PBBFAC,,, | Performed by: OBSTETRICS & GYNECOLOGY

## 2019-10-23 PROCEDURE — 87481 CANDIDA DNA AMP PROBE: CPT | Mod: 59

## 2019-10-23 PROCEDURE — 77067 SCR MAMMO BI INCL CAD: CPT | Mod: 26,,, | Performed by: RADIOLOGY

## 2019-10-23 PROCEDURE — 87624 HPV HI-RISK TYP POOLED RSLT: CPT

## 2019-10-23 PROCEDURE — 87491 CHLMYD TRACH DNA AMP PROBE: CPT

## 2019-10-23 PROCEDURE — 99999 PR PBB SHADOW E&M-EST. PATIENT-LVL II: ICD-10-PCS | Mod: PBBFAC,,, | Performed by: OBSTETRICS & GYNECOLOGY

## 2019-10-23 PROCEDURE — 88175 CYTOPATH C/V AUTO FLUID REDO: CPT

## 2019-10-23 PROCEDURE — 87801 DETECT AGNT MULT DNA AMPLI: CPT

## 2019-10-23 PROCEDURE — 99396 PREV VISIT EST AGE 40-64: CPT | Mod: S$GLB,,, | Performed by: OBSTETRICS & GYNECOLOGY

## 2019-10-23 PROCEDURE — 77067 MAMMO DIGITAL SCREENING BILAT WITH TOMOSYNTHESIS_CAD: ICD-10-PCS | Mod: 26,,, | Performed by: RADIOLOGY

## 2019-10-23 PROCEDURE — 77063 BREAST TOMOSYNTHESIS BI: CPT | Mod: 26,,, | Performed by: RADIOLOGY

## 2019-10-23 PROCEDURE — 77063 MAMMO DIGITAL SCREENING BILAT WITH TOMOSYNTHESIS_CAD: ICD-10-PCS | Mod: 26,,, | Performed by: RADIOLOGY

## 2019-10-23 PROCEDURE — 77063 BREAST TOMOSYNTHESIS BI: CPT | Mod: TC

## 2019-10-23 PROCEDURE — 99396 PR PREVENTIVE VISIT,EST,40-64: ICD-10-PCS | Mod: S$GLB,,, | Performed by: OBSTETRICS & GYNECOLOGY

## 2019-10-23 NOTE — PROGRESS NOTES
Chief Complaint   Patient presents with    Annual Exam       HISTORY OF PRESENT ILLNESS:   Tena Pabon is a 49 y.o. female  who presents for well woman exam.  Patient's last menstrual period was 10/10/2019..  She has no complaints. Cycles became more irregular over the past year. She will skip 2-3 months at a time but then will get a normal period.  Desires STD testing.      Past Medical History:   Diagnosis Date    Abnormal Pap smear of cervix           Past Surgical History:   Procedure Laterality Date    WISDOM TOOTH EXTRACTION           Social History     Socioeconomic History    Marital status: Single     Spouse name: Not on file    Number of children: Not on file    Years of education: Not on file    Highest education level: Not on file   Occupational History    Not on file   Social Needs    Financial resource strain: Not on file    Food insecurity:     Worry: Not on file     Inability: Not on file    Transportation needs:     Medical: Not on file     Non-medical: Not on file   Tobacco Use    Smoking status: Never Smoker    Smokeless tobacco: Never Used   Substance and Sexual Activity    Alcohol use: Yes    Drug use: No    Sexual activity: Yes     Partners: Male     Birth control/protection: Condom   Lifestyle    Physical activity:     Days per week: Not on file     Minutes per session: Not on file    Stress: Not on file   Relationships    Social connections:     Talks on phone: Not on file     Gets together: Not on file     Attends Zoroastrian service: Not on file     Active member of club or organization: Not on file     Attends meetings of clubs or organizations: Not on file     Relationship status: Not on file   Other Topics Concern    Not on file   Social History Narrative    Not on file       Family History   Problem Relation Age of Onset    Breast cancer Neg Hx     Cancer Neg Hx     Colon cancer Neg Hx     Diabetes Neg Hx     Eclampsia Neg Hx     Hypertension Neg Hx   "   Miscarriages / Stillbirths Neg Hx     Ovarian cancer Neg Hx      labor Neg Hx     Stroke Neg Hx          OB History    Para Term  AB Living   0 0 0 0 0 0   SAB TAB Ectopic Multiple Live Births   0 0 0 0 0       COMPREHENSIVE GYN HISTORY:  PAP History: Denies abnormal Paps, LSIL 2017 with colpo with CHITRA 1 (was only abn pap smear)  Infection History: Denies STDs. Denies PID.  Benign History: Denies uterine fibroids. Denies ovarian cysts. Denies endometriosis Denies other conditions.  Cancer History: Denies cervical cancer. Denies uterine cancer or hyperplasia. Denies ovarian cancer. Denies vulvar cancer or pre-cancer. Denies vaginal cancer or pre-cancer. Denies breast cancer. Denies colon cancer.  Cycle: 13/31/3-4d    ROS:  GENERAL: Denies weight gain or weight loss. Feeling well overall.   SKIN: Denies rash or lesions.   HEAD: Denies headache.   NODES: Denies enlarged lymph nodes.   CHEST: Denies shortness of breath.   ABDOMEN: No abdominal pain, constipation, diarrhea, nausea, vomiting or rectal bleeding.   URINARY: No frequency, dysuria, hematuria, or burning on urination.  REPRODUCTIVE: See HPI.   BREASTS: The patient denies pain, lumps, or nipple discharge.       /70   Ht 5' 1" (1.549 m)   Wt 56 kg (123 lb 7.3 oz)   LMP 10/10/2019   BMI 23.33 kg/m²   APPEARANCE: Well nourished, well developed, in no acute distress.  NECK: Neck symmetric without  thyromegaly.  NODES: No inguinal, cervical lymph node enlargement.  CHEST: Lungs clear to auscultation.  HEART: Regular rate and rhythm, no murmurs, rubs or gallops.  ABDOMEN: Soft. No tenderness or masses. No hernias. No hepatosplenomegaly.  BREASTS: Symmetrical, no skin changes or visible lesions. No palpable masses, nipple discharge or adenopathy bilaterally.  PELVIC:   VULVA: No lesions. Normal female genitalia.  URETHRAL MEATUS: Normal size and location, no lesions, no prolapse.  URETHRA: No masses, tenderness, prolapse or " scarring.  VAGINA: Moist and well rugated, scant discharge, no significant cystocele or rectocele.  CERVIX: No lesions and discharge.  UTERUS: Normal size, regular shape, mobile, non-tender, bladder base nontender.  ADNEXA: No masses or tenderness.    Data Reviewed:     Last MMG: Date: BIRADs 1      1. Encounter for annual routine gynecological examination    2. Pap smear for cervical cancer screening    3. Screening examination for STD (sexually transmitted disease)    4. Diabetes mellitus screening    5. Screening cholesterol level    6. Thyroid disorder screen        Plan:   1. Routine gyn annual exam. s/p normal breast exam and MMG ordered.  Pap with HPV cotesting ordered. STD testing: ordered. Will use condoms if becomes sexually active.   Doesn't have PCP so routine screening ordered, discussed colonoscopy and she declines and would like to do FOBT, discussed will need to see PCP to have that done and gave her names at [facility].   2. Last year pap smear was normal so repeat done now. She would like to not space them out and do them every year.   3. Discussed cycles sound like perimenopausal phase. We discussed what would be irregular and when to contact us. When goes 1 year with no cycle then is menopausal.         F/u in 1 yr or PRN

## 2019-10-24 ENCOUNTER — TELEPHONE (OUTPATIENT)
Dept: OBSTETRICS AND GYNECOLOGY | Facility: CLINIC | Age: 49
End: 2019-10-24

## 2019-10-24 LAB
C TRACH DNA SPEC QL NAA+PROBE: NOT DETECTED
N GONORRHOEA DNA SPEC QL NAA+PROBE: NOT DETECTED

## 2019-10-24 NOTE — TELEPHONE ENCOUNTER
Please let her know that her thyroid testing, DM testing, blood counts and electrolytes look normal. Her cholesterol is still elevated. I know we discussed diet but I would also recommend she follow up with a PCP and I can send a referral for one of the PCPs at [facility] if she would like as she will likely need to start cholesterol medications.

## 2019-10-24 NOTE — TELEPHONE ENCOUNTER
Called to in form pt that her thyroid testing, DM testing, blood counts and electrolytes look normal. Her cholesterol is still elevated. I know we discussed diet but I would also recommend she follow up with a PCP and I can send a referral for one of the PCPs at [facility] if she would like as she will likely need to start cholesterol medications. pt did not answer. Was unable to leav v/m due to it not being set up.

## 2019-10-25 ENCOUNTER — TELEPHONE (OUTPATIENT)
Dept: OBSTETRICS AND GYNECOLOGY | Facility: CLINIC | Age: 49
End: 2019-10-25

## 2019-10-25 LAB
BACTERIAL VAGINOSIS DNA: NEGATIVE
CANDIDA GLABRATA DNA: NEGATIVE
CANDIDA KRUSEI DNA: NEGATIVE
CANDIDA RRNA VAG QL PROBE: NEGATIVE
T VAGINALIS RRNA GENITAL QL PROBE: NEGATIVE

## 2019-10-25 NOTE — TELEPHONE ENCOUNTER
Called to inform pt that her std screening for Gc/Ct/Trich all came back negative. Pt did not answer and was unable to leave v/m due to mailbox not being set up.

## 2019-10-25 NOTE — TELEPHONE ENCOUNTER
----- Message from Alana Staples MD sent at 10/25/2019  1:11 PM CDT -----  Please let patient know that her STD screening including her GC/CT/trich was negative. We are still waiting on the results of her pap smear. Thanks.

## 2019-10-25 NOTE — PROGRESS NOTES
Please let patient know that her STD screening including her GC/CT/trich was negative. We are still waiting on the results of her pap smear. Thanks.

## 2019-10-31 NOTE — PROGRESS NOTES
Please send patient pap smear letter or call. Her pap was negative and negative for the HPV virus so since it has been 2 years since she had an abnormal pap smear the new recommendations are that she will not need another one for 5 years but we will still continue to see her for annuals. thanks.

## 2019-12-30 ENCOUNTER — OFFICE VISIT (OUTPATIENT)
Dept: INTERNAL MEDICINE | Facility: CLINIC | Age: 49
End: 2019-12-30
Payer: COMMERCIAL

## 2019-12-30 ENCOUNTER — LAB VISIT (OUTPATIENT)
Dept: LAB | Facility: HOSPITAL | Age: 49
End: 2019-12-30
Attending: INTERNAL MEDICINE
Payer: COMMERCIAL

## 2019-12-30 VITALS
BODY MASS INDEX: 25.19 KG/M2 | SYSTOLIC BLOOD PRESSURE: 110 MMHG | WEIGHT: 128.31 LBS | HEART RATE: 79 BPM | OXYGEN SATURATION: 99 % | HEIGHT: 60 IN | DIASTOLIC BLOOD PRESSURE: 74 MMHG

## 2019-12-30 DIAGNOSIS — H57.89 EYE IRRITATION: ICD-10-CM

## 2019-12-30 DIAGNOSIS — R53.83 FATIGUE, UNSPECIFIED TYPE: ICD-10-CM

## 2019-12-30 DIAGNOSIS — Z72.820 POOR SLEEP: ICD-10-CM

## 2019-12-30 DIAGNOSIS — Z00.00 ANNUAL PHYSICAL EXAM: ICD-10-CM

## 2019-12-30 DIAGNOSIS — R71.8 LOW MEAN CORPUSCULAR VOLUME (MCV): ICD-10-CM

## 2019-12-30 DIAGNOSIS — Z86.39 HISTORY OF IRON DEFICIENCY: ICD-10-CM

## 2019-12-30 DIAGNOSIS — Z76.89 ENCOUNTER TO ESTABLISH CARE: ICD-10-CM

## 2019-12-30 LAB
FERRITIN SERPL-MCNC: 15 NG/ML (ref 20–300)
IRON SERPL-MCNC: 84 UG/DL (ref 30–160)
SATURATED IRON: 22 % (ref 20–50)
TOTAL IRON BINDING CAPACITY: 374 UG/DL (ref 250–450)
TRANSFERRIN SERPL-MCNC: 253 MG/DL (ref 200–375)
TSH SERPL DL<=0.005 MIU/L-ACNC: 0.98 UIU/ML (ref 0.4–4)

## 2019-12-30 PROCEDURE — 99386 PREV VISIT NEW AGE 40-64: CPT | Mod: S$GLB,,, | Performed by: INTERNAL MEDICINE

## 2019-12-30 PROCEDURE — 99999 PR PBB SHADOW E&M-EST. PATIENT-LVL IV: CPT | Mod: PBBFAC,,, | Performed by: INTERNAL MEDICINE

## 2019-12-30 PROCEDURE — 36415 COLL VENOUS BLD VENIPUNCTURE: CPT | Mod: PO

## 2019-12-30 PROCEDURE — 83540 ASSAY OF IRON: CPT

## 2019-12-30 PROCEDURE — 99386 PR PREVENTIVE VISIT,NEW,40-64: ICD-10-PCS | Mod: S$GLB,,, | Performed by: INTERNAL MEDICINE

## 2019-12-30 PROCEDURE — 82728 ASSAY OF FERRITIN: CPT

## 2019-12-30 PROCEDURE — 84443 ASSAY THYROID STIM HORMONE: CPT

## 2019-12-30 PROCEDURE — 99999 PR PBB SHADOW E&M-EST. PATIENT-LVL IV: ICD-10-PCS | Mod: PBBFAC,,, | Performed by: INTERNAL MEDICINE

## 2019-12-30 NOTE — PROGRESS NOTES
Patient ID: Tena Pabon is a 49 y.o. female.    Chief Complaint: Establish Care    RAY Rodríguez is a 49 y.o. female with history of abnormal Pap smear who presents today to [facility] and with a chief complaint of fatigue.  Onset of fatigue was 1 year or more ago.  It became very noticeable over the summertime.  Upon questioning, admits that she has had low iron levels in the past.  Denies any nighttime snoring however she reports that she often will drink a glass of wine before bed.  Her bedtime will typically be around 7 or 8:00 p.m..  She will sleep for about 4 hr.  Then she will get up and work for a few hours on the computer.  Then she will go to sleep for about 2 more hours.  She has been doing this for a long time.  She also reports pain in her eyes.  Is located in both eyes.  Onset was around the time she began wearing contacts.  It is not associated with any eye redness or discharge. It is a constant problem in duration.    Review of Systems   Constitutional: Positive for fatigue.   Eyes: Positive for pain. Negative for discharge and redness.   All other systems reviewed and are negative.       Objective:     Vitals:    12/30/19 1058   BP: 110/74   Pulse: 79        Physical Exam   Constitutional: She is oriented to person, place, and time. She appears well-developed and well-nourished. No distress.   HENT:   Head: Normocephalic and atraumatic.   Right Ear: External ear normal.   Left Ear: External ear normal.   Nose: Nose normal.   Mouth/Throat: Oropharynx is clear and moist. No oropharyngeal exudate.   Eyes: Conjunctivae and EOM are normal. Right eye exhibits no discharge. Left eye exhibits no discharge. No scleral icterus.   Neck: Neck supple. No tracheal deviation present. No thyromegaly present.   Cardiovascular: Normal rate, regular rhythm, normal heart sounds and intact distal pulses. Exam reveals no gallop and no friction rub.   No murmur heard.  Pulmonary/Chest: Effort normal and breath  sounds normal. No stridor. No respiratory distress. She has no wheezes. She has no rales. She exhibits no tenderness.   Abdominal: Soft. Bowel sounds are normal. She exhibits no distension and no mass. There is no tenderness. There is no rebound and no guarding. No hernia.   Musculoskeletal: She exhibits no edema, tenderness or deformity.   Lymphadenopathy:     She has no cervical adenopathy.   Neurological: She is alert and oriented to person, place, and time.   Skin: Skin is warm and dry. No rash noted. She is not diaphoretic. No erythema.   Psychiatric: She has a normal mood and affect. Her behavior is normal. Judgment and thought content normal.   Vitals reviewed.      Assessment:       1. Annual physical exam    2. Encounter to establish care    3. Eye irritation    4. Poor sleep Active   5. Fatigue, unspecified type Active   6. Low mean corpuscular volume (MCV) Active   7. History of iron deficiency Chronic       Plan:     Discussed with patient that her fatigue is likely due to poor sleep quality and quantity. We discussed in detail the importance of good sleep hygiene, including having a set bedtime and awakening time.  She needs to strive for a minimum of 7 continuous/uninterrupted hours of sleep each night.  I recommend that she keep all electronics out of the bedroom.  I also recommend that she not have any alcohol before bed.  If she would like to have a glass of wine with dinner, she needs to make sure this dinner occurs a few hours before bedtime. She can try using over the counter melatonin to assist with starting sleep at appropriate time in the evening. Black out curtains and ambient noise can also be helpful.    She declines vaccines today    Annual physical exam  Comments:  Up-to-date with Pap smear and mammogram.  No need to repeat all basic labs as she recently had labs done about 2 months ago  Orders:  -     TSH; Future; Expected date: 12/30/2019  -     Iron and TIBC; Future; Expected date:  12/30/2019  -     Ferritin; Future; Expected date: 12/30/2019    Encounter to establish care    Eye irritation  Comments:  Likely due to eye dryness while wearing contacts. See instructions in AVS  Orders:  -     Ambulatory Referral to Optometry    Poor sleep    Fatigue, unspecified type  Comments:  likely due to poor sleep    Low mean corpuscular volume (MCV)  Comments:  Will check iron levels to evaluate for iron deficiency as a cause    History of iron deficiency  Comments:  Check iron levels.       RTC one year and PRN      Warning signs discussed, patient to call with any further issues or worsening of symptoms.       Parts of the above note were dictated using a voice dictation software. Please excuse any grammatical or typographical errors.

## 2020-01-02 ENCOUNTER — OFFICE VISIT (OUTPATIENT)
Dept: OPTOMETRY | Facility: CLINIC | Age: 50
End: 2020-01-02
Payer: COMMERCIAL

## 2020-01-02 DIAGNOSIS — Z46.0 FITTING AND ADJUSTMENT OF SPECTACLES AND CONTACT LENSES: Primary | ICD-10-CM

## 2020-01-02 DIAGNOSIS — H52.4 ASTIGMATISM OF BOTH EYES WITH PRESBYOPIA: Primary | ICD-10-CM

## 2020-01-02 DIAGNOSIS — H52.203 ASTIGMATISM OF BOTH EYES WITH PRESBYOPIA: Primary | ICD-10-CM

## 2020-01-02 PROCEDURE — 99999 PR PBB SHADOW E&M-EST. PATIENT-LVL II: CPT | Mod: PBBFAC,,, | Performed by: OPTOMETRIST

## 2020-01-02 PROCEDURE — 92310 CONTACT LENS FITTING OU: CPT | Mod: CSM,,, | Performed by: OPTOMETRIST

## 2020-01-02 PROCEDURE — 99999 PR PBB SHADOW E&M-EST. PATIENT-LVL II: ICD-10-PCS | Mod: PBBFAC,,, | Performed by: OPTOMETRIST

## 2020-01-02 PROCEDURE — 92310 PR CONTACT LENS FITTING (NO CHANGE): ICD-10-PCS | Mod: CSM,,, | Performed by: OPTOMETRIST

## 2020-01-02 PROCEDURE — 92004 PR EYE EXAM, NEW PATIENT,COMPREHESV: ICD-10-PCS | Mod: S$GLB,,, | Performed by: OPTOMETRIST

## 2020-01-02 PROCEDURE — 92015 DETERMINE REFRACTIVE STATE: CPT | Mod: S$GLB,,, | Performed by: OPTOMETRIST

## 2020-01-02 PROCEDURE — 92015 PR REFRACTION: ICD-10-PCS | Mod: S$GLB,,, | Performed by: OPTOMETRIST

## 2020-01-02 PROCEDURE — 92004 COMPRE OPH EXAM NEW PT 1/>: CPT | Mod: S$GLB,,, | Performed by: OPTOMETRIST

## 2020-01-02 NOTE — PROGRESS NOTES
HPI     ROMERO: 3/25/19 dr. bright  Pt states is  Wearing one contact in her left eye, pt states she wears two   contacts but only wearing one today. Pt states when she wake up her eyes   are both sore, and also states her eyes are hard to open and some crust   but not a lot. Bio true saline she has been using, her dr. Told her to try   systane but has not tried it yet   No f/f  No sx        Last edited by Flora Wade MA on 1/2/2020  9:45 AM. (History)        ROS     Negative for: Constitutional, Gastrointestinal, Neurological, Skin,   Genitourinary, Musculoskeletal, HENT, Endocrine, Cardiovascular, Eyes,   Respiratory, Psychiatric, Allergic/Imm, Heme/Lymph    Last edited by Sumit Casper, OD on 1/2/2020 10:19 AM. (History)        Assessment /Plan     For exam results, see Encounter Report.    Astigmatism of both eyes with presbyopia      1. Pt brought in old Rxs from Dr Bright.  She was originally fit in OASYS ASTIG monovision (OS near), but she found her CLs kept getting smudgy and she wasn't able to clean, so she went online and ordered herself some OASYS ONE DAY ASTIG CLs (she said the 1[facility]800 outlet called and confirmed Rx w Dr Bright, but she never saw him and has no prescription for dailies).  IN ANY CASE she feels her distance VA OD is good without the CL, so she just wears the one day CL OS for near, BUT wears them a couple of days before throwing away (she says she does not sleep in them).  Discussed w pt that the daily CLs are SUPPOSED to be thrown away DAILY.  The fit is good with the CL, but refraction shows she needs a slight axis change OS.  I reassured pt it is OK to just wear the left lens for near, and no CL OD for distance since her VA is good.    2. Pt reports she uses BIOTRUE  as a rewetting drop.  Discussed potential problems in doing so, and advised pt switch to REFRESH PLUS ATs as her rewtting drops    PLAN:    1. Wrote optional new spex Rx  2. Wrote CLRx: OASYS 1-DAY  OS (near).  No CL OD for distance  3. Continue Daily Wear schedule.  NO SLEEPING IN CONTACT LENSES. exchange DAILY  4. rtc 1 yr

## 2020-01-02 NOTE — LETTER
January 2, 2020      Daphnie Zavala MD  [address]  [place] [address]           [facility]  [address] [facility] [address]  [place] [address]  Phone: [phone]  Fax: [phone]          Patient: Tena Pabon   MR Number: [id number]   YOB: 1970   Date of Visit: 1/2/2020       Dear Dr. Daphnie Zavala:    Thank you for referring Tena Pabon to me for evaluation. Attached you will find relevant portions of my assessment and plan of care.    If you have questions, please do not hesitate to call me. I look forward to following Tena Pabon along with you.    Sincerely,    Sumit Casper, OD    Enclosure  CC:  No Recipients    If you would like to receive this communication electronically, please contact [email or web address] or [phone] to request more information on [facility] access.    For providers and/or their staff who would like to refer a patient to [facility], please contact us through our one-stop-shop provider referral line, [facility], at [phone].    If you feel you have received this communication in error or would no longer like to receive these types of communications, please e-mail [email or web address]

## 2020-01-08 DIAGNOSIS — E61.1 IRON DEFICIENCY: Primary | ICD-10-CM

## 2020-01-09 ENCOUNTER — TELEPHONE (OUTPATIENT)
Dept: INTERNAL MEDICINE | Facility: CLINIC | Age: 50
End: 2020-01-09

## 2020-01-09 NOTE — TELEPHONE ENCOUNTER
----- Message from Daphnie Zavala MD sent at 1/8/2020  8:39 PM CST -----  Please call the patient regarding her labs. Thyroid function is normal. Iron level is the low end of normal. Ferritin is borderline for iron deficiency. Labs are suspicious for early iron deficiency. I recommend taking an over the counter multivitamin with iron daily and we will recheck levels in 3 months.  Thanks

## 2020-01-13 ENCOUNTER — TELEPHONE (OUTPATIENT)
Dept: INTERNAL MEDICINE | Facility: CLINIC | Age: 50
End: 2020-01-13

## 2020-01-13 NOTE — TELEPHONE ENCOUNTER
----- Message from Kyle Farmer sent at 1/13/2020 10:10 AM CST -----  Contact: [phone] / self   Patient is returning a call from your office. Please Advise.

## 2020-01-13 NOTE — TELEPHONE ENCOUNTER
Spoke with patient about lab result.  Patient was having trouble understand me, she states my accent was to strong for her to understand me.  I repeated the result again and she states she understood.  Patient will call back to scheduled her 3 mos lab at her convenience.

## 2020-10-12 ENCOUNTER — TELEPHONE (OUTPATIENT)
Dept: OBSTETRICS AND GYNECOLOGY | Facility: CLINIC | Age: 50
End: 2020-10-12

## 2020-10-12 DIAGNOSIS — Z12.31 SCREENING MAMMOGRAM, ENCOUNTER FOR: Primary | ICD-10-CM

## 2020-10-12 NOTE — TELEPHONE ENCOUNTER
----- Message from Radha Sullivan MA sent at 10/9/2020  3:46 PM CDT -----  Contact: [phone]/Self    ----- Message -----  From: Chari Burton  Sent: 10/9/2020   3:40 PM CDT  To: [facility]    Type:  Mammogram    Caller is requesting to schedule their annual mammogram appointment.  Order is not listed in EPIC.  Please enter order and contact patient to schedule.  Name of Caller:Pt  Where would they like the mammogram performed?[facility]   Would the patient rather a call back or a response via [facility]? Call back   Best Call Back Number:[phone]  Additional Information: Pt also requesting lab work orders

## 2020-10-29 ENCOUNTER — OFFICE VISIT (OUTPATIENT)
Dept: OBSTETRICS AND GYNECOLOGY | Facility: CLINIC | Age: 50
End: 2020-10-29
Payer: COMMERCIAL

## 2020-10-29 ENCOUNTER — HOSPITAL ENCOUNTER (OUTPATIENT)
Dept: RADIOLOGY | Facility: HOSPITAL | Age: 50
Discharge: HOME | End: 2020-10-29
Attending: OBSTETRICS & GYNECOLOGY
Payer: COMMERCIAL

## 2020-10-29 ENCOUNTER — TELEPHONE (OUTPATIENT)
Dept: OBSTETRICS AND GYNECOLOGY | Facility: CLINIC | Age: 50
End: 2020-10-29

## 2020-10-29 VITALS
BODY MASS INDEX: 24.97 KG/M2 | DIASTOLIC BLOOD PRESSURE: 60 MMHG | WEIGHT: 127.19 LBS | SYSTOLIC BLOOD PRESSURE: 120 MMHG | HEIGHT: 60 IN

## 2020-10-29 DIAGNOSIS — Z01.419 ENCOUNTER FOR ANNUAL ROUTINE GYNECOLOGICAL EXAMINATION: Primary | ICD-10-CM

## 2020-10-29 DIAGNOSIS — D64.9 ANEMIA, UNSPECIFIED TYPE: ICD-10-CM

## 2020-10-29 DIAGNOSIS — Z12.4 PAP SMEAR FOR CERVICAL CANCER SCREENING: ICD-10-CM

## 2020-10-29 DIAGNOSIS — Z12.31 SCREENING MAMMOGRAM, ENCOUNTER FOR: ICD-10-CM

## 2020-10-29 DIAGNOSIS — E78.00 HIGH CHOLESTEROL: ICD-10-CM

## 2020-10-29 DIAGNOSIS — Z11.3 SCREENING EXAMINATION FOR STD (SEXUALLY TRANSMITTED DISEASE): ICD-10-CM

## 2020-10-29 PROCEDURE — 77063 MAMMO DIGITAL SCREENING BILAT WITH TOMO: ICD-10-PCS | Mod: 26,,, | Performed by: RADIOLOGY

## 2020-10-29 PROCEDURE — 88175 CYTOPATH C/V AUTO FLUID REDO: CPT

## 2020-10-29 PROCEDURE — 99396 PR PREVENTIVE VISIT,EST,40-64: ICD-10-PCS | Mod: S$GLB,,, | Performed by: OBSTETRICS & GYNECOLOGY

## 2020-10-29 PROCEDURE — 99999 PR PBB SHADOW E&M-EST. PATIENT-LVL III: CPT | Mod: PBBFAC,,, | Performed by: OBSTETRICS & GYNECOLOGY

## 2020-10-29 PROCEDURE — 77067 SCR MAMMO BI INCL CAD: CPT | Mod: 26,,, | Performed by: RADIOLOGY

## 2020-10-29 PROCEDURE — 77067 SCR MAMMO BI INCL CAD: CPT | Mod: TC

## 2020-10-29 PROCEDURE — 77063 BREAST TOMOSYNTHESIS BI: CPT | Mod: 26,,, | Performed by: RADIOLOGY

## 2020-10-29 PROCEDURE — 99396 PREV VISIT EST AGE 40-64: CPT | Mod: S$GLB,,, | Performed by: OBSTETRICS & GYNECOLOGY

## 2020-10-29 PROCEDURE — 3008F BODY MASS INDEX DOCD: CPT | Mod: CPTII,S$GLB,, | Performed by: OBSTETRICS & GYNECOLOGY

## 2020-10-29 PROCEDURE — 99999 PR PBB SHADOW E&M-EST. PATIENT-LVL III: ICD-10-PCS | Mod: PBBFAC,,, | Performed by: OBSTETRICS & GYNECOLOGY

## 2020-10-29 PROCEDURE — 3008F PR BODY MASS INDEX (BMI) DOCUMENTED: ICD-10-PCS | Mod: CPTII,S$GLB,, | Performed by: OBSTETRICS & GYNECOLOGY

## 2020-10-29 PROCEDURE — 77067 MAMMO DIGITAL SCREENING BILAT WITH TOMO: ICD-10-PCS | Mod: 26,,, | Performed by: RADIOLOGY

## 2020-10-29 NOTE — TELEPHONE ENCOUNTER
Can you please let her know that her cholesterol level is elevated and it has been the past 2 years. I suspect she needs medications to help it improve at this point. I put a referral in for family medicine so she can get a primary care doctor to help with that. She is also mildly anemic so I put in orders to check her iron levels to see if she needs to be started on iron. She can go to do that at any point. Her screen for diabetes was negative and we are still waiting on the STD screening.

## 2020-10-29 NOTE — PROGRESS NOTES
Chief Complaint   Patient presents with    Well Woman       HISTORY OF PRESENT ILLNESS:   Tena Pabon is a 50 y.o. female  who presents for well woman exam.  Patient's last menstrual period was 10/10/2019..  She has no complaints. Cycles stopped exactly a year ago today.      Past Medical History:   Diagnosis Date    Abnormal Pap smear of cervix           Past Surgical History:   Procedure Laterality Date    WISDOM TOOTH EXTRACTION           Social History     Socioeconomic History    Marital status: Single     Spouse name: Not on file    Number of children: Not on file    Years of education: Not on file    Highest education level: Not on file   Occupational History    Not on file   Social Needs    Financial resource strain: Not on file    Food insecurity     Worry: Not on file     Inability: Not on file    Transportation needs     Medical: Not on file     Non-medical: Not on file   Tobacco Use    Smoking status: Never Smoker    Smokeless tobacco: Never Used   Substance and Sexual Activity    Alcohol use: Yes    Drug use: No    Sexual activity: Yes     Partners: Male     Birth control/protection: Condom   Lifestyle    Physical activity     Days per week: Not on file     Minutes per session: Not on file    Stress: Not on file   Relationships    Social connections     Talks on phone: Not on file     Gets together: Not on file     Attends Yazidi service: Not on file     Active member of club or organization: Not on file     Attends meetings of clubs or organizations: Not on file     Relationship status: Not on file   Other Topics Concern    Not on file   Social History Narrative    Not on file       Family History   Problem Relation Age of Onset    Breast cancer Neg Hx     Cancer Neg Hx     Colon cancer Neg Hx     Diabetes Neg Hx     Eclampsia Neg Hx     Hypertension Neg Hx     Miscarriages / Stillbirths Neg Hx     Ovarian cancer Neg Hx      labor Neg Hx     Stroke  Neg Hx          OB History    Para Term  AB Living   0 0 0 0 0 0   SAB TAB Ectopic Multiple Live Births   0 0 0 0 0       COMPREHENSIVE GYN HISTORY:  PAP History: Denies abnormal Paps, LSIL 2017 with colpo with CHITRA 1 (was only abn pap smear); 2018 NILM/HPV-, 2019 NILM/HPV-  Infection History: Denies STDs. Denies PID.  Benign History: Denies uterine fibroids. Denies ovarian cysts. Denies endometriosis Denies other conditions.  Cancer History: Denies cervical cancer. Denies uterine cancer or hyperplasia. Denies ovarian cancer. Denies vulvar cancer or pre-cancer. Denies vaginal cancer or pre-cancer. Denies breast cancer. Denies colon cancer.  Cycle: 13/31/3-4d    ROS:  GENERAL: Denies weight gain or weight loss. Feeling well overall.   SKIN: Denies rash or lesions.   HEAD: Denies headache.   NODES: Denies enlarged lymph nodes.   CHEST: Denies shortness of breath.   ABDOMEN: No abdominal pain, constipation, diarrhea, nausea, vomiting or rectal bleeding.   URINARY: No frequency, dysuria, hematuria, or burning on urination.  REPRODUCTIVE: See HPI.   BREASTS: The patient denies pain, lumps, or nipple discharge.       /60   Ht 5' (1.524 m)   Wt 57.7 kg (127 lb 3.3 oz)   LMP 10/10/2019   BMI 24.84 kg/m²   APPEARANCE: Well nourished, well developed, in no acute distress.  NECK: Neck symmetric without  thyromegaly.  NODES: No inguinal, cervical lymph node enlargement.  CHEST: Lungs clear to auscultation.  HEART: Regular rate and rhythm, no murmurs, rubs or gallops.  ABDOMEN: Soft. No tenderness or masses. No hernias. No hepatosplenomegaly.  BREASTS: Symmetrical, no skin changes or visible lesions. No palpable masses, nipple discharge or adenopathy bilaterally.  PELVIC:   VULVA: No lesions. Normal female genitalia.  URETHRAL MEATUS: Normal size and location, no lesions, no prolapse.  URETHRA: No masses, tenderness, prolapse or scarring.  VAGINA: Moist and well rugated, scant discharge, no significant  cystocele or rectocele.  CERVIX: No lesions and discharge.  UTERUS: Normal size, regular shape, mobile, non-tender, bladder base nontender.  ADNEXA: No masses or tenderness.    Data Reviewed:     Last MMG: Date: BIRADs 1      1. Encounter for annual routine gynecological examination    2. Screening mammogram, encounter for    3. Screening examination for STD (sexually transmitted disease)        Plan:   1. Routine gyn annual exam. s/p normal breast exam and MMG ordered.  Pap without cotesting ordered, wants to do yearly, doesn't need repeat HPV testing until 2022 then can go every 5 years. STD testing: ordered. Will use condoms if becomes sexually active.   Doesn't have PCP so routine screening ordered, discussed colonoscopy and she declines and would like to do FOBT, discussed will need to see PCP to have that done and gave her names at [place].   2. Discussed in menopause, hotflashes not symptomatic so will monitor   3. Wants to do gc/ct testing but we don't have reagent, no symptoms, she is going to call back in December and see if we can order then        F/u in 1 yr or PRN

## 2020-10-30 ENCOUNTER — TELEPHONE (OUTPATIENT)
Dept: OBSTETRICS AND GYNECOLOGY | Facility: CLINIC | Age: 50
End: 2020-10-30

## 2020-12-04 LAB
FINAL PATHOLOGIC DIAGNOSIS: NORMAL
Lab: NORMAL

## 2020-12-07 NOTE — PROGRESS NOTES
Please send patient pap letter or call to let her know her pap smear was negative. We will still see her annually for her exams. Thanks.

## 2021-06-14 ENCOUNTER — TELEPHONE (OUTPATIENT)
Dept: INTERNAL MEDICINE | Facility: CLINIC | Age: 51
End: 2021-06-14

## 2021-07-19 ENCOUNTER — TELEPHONE (OUTPATIENT)
Dept: INTERNAL MEDICINE | Facility: CLINIC | Age: 51
End: 2021-07-19

## 2021-07-21 ENCOUNTER — TELEPHONE (OUTPATIENT)
Dept: INTERNAL MEDICINE | Facility: CLINIC | Age: 51
End: 2021-07-21

## 2021-09-15 ENCOUNTER — TELEPHONE (OUTPATIENT)
Dept: OBSTETRICS AND GYNECOLOGY | Facility: CLINIC | Age: 51
End: 2021-09-15
Payer: COMMERCIAL

## 2021-09-15 ENCOUNTER — TELEPHONE (OUTPATIENT)
Dept: OBSTETRICS AND GYNECOLOGY | Facility: CLINIC | Age: 51
End: 2021-09-15

## 2021-10-12 ENCOUNTER — TELEPHONE (OUTPATIENT)
Dept: OBSTETRICS AND GYNECOLOGY | Facility: CLINIC | Age: 51
End: 2021-10-12